# Patient Record
Sex: MALE | Race: WHITE | Employment: OTHER | URBAN - NONMETROPOLITAN AREA
[De-identification: names, ages, dates, MRNs, and addresses within clinical notes are randomized per-mention and may not be internally consistent; named-entity substitution may affect disease eponyms.]

---

## 2020-04-24 ENCOUNTER — TRANSFERRED RECORDS (OUTPATIENT)
Dept: HEALTH INFORMATION MANAGEMENT | Facility: HOSPITAL | Age: 75
End: 2020-04-24

## 2020-04-29 ENCOUNTER — TELEPHONE (OUTPATIENT)
Dept: FAMILY MEDICINE | Facility: OTHER | Age: 75
End: 2020-04-29

## 2020-04-29 NOTE — TELEPHONE ENCOUNTER
To: Surgery department  Patient sent in cologuard test that came back positive and he was suppose to get a call from us to set a up a consult for a colonoscopy.  His phone is [phone].  Thank you

## 2020-05-01 NOTE — TELEPHONE ENCOUNTER
Returned call to patient. He states that [facility]. I do not see a referral. Advised [facility] to contact the [facility] to [facility] referral and then we can get him scheduled.

## 2020-05-05 ENCOUNTER — TELEPHONE (OUTPATIENT)
Dept: SURGERY | Facility: OTHER | Age: 75
End: 2020-05-05

## 2020-05-05 NOTE — TELEPHONE ENCOUNTER
All referrals are suppose to go to the [facility] for the current week. Gave all refferals to Ingrid and she notified Juan of this.

## 2020-05-11 ENCOUNTER — PREP FOR PROCEDURE (OUTPATIENT)
Dept: SURGERY | Facility: OTHER | Age: 75
End: 2020-05-11

## 2020-05-11 ENCOUNTER — OFFICE VISIT (OUTPATIENT)
Dept: SURGERY | Facility: OTHER | Age: 75
End: 2020-05-11
Attending: SURGERY
Payer: COMMERCIAL

## 2020-05-11 VITALS
BODY MASS INDEX: 37.1 KG/M2 | TEMPERATURE: 97 F | HEART RATE: 67 BPM | WEIGHT: 265 LBS | OXYGEN SATURATION: 97 % | HEIGHT: 71 IN | DIASTOLIC BLOOD PRESSURE: 72 MMHG | SYSTOLIC BLOOD PRESSURE: 134 MMHG

## 2020-05-11 DIAGNOSIS — R19.5 POSITIVE COLORECTAL CANCER SCREENING USING COLOGUARD TEST: Primary | ICD-10-CM

## 2020-05-11 PROCEDURE — 99203 OFFICE O/P NEW LOW 30 MIN: CPT | Performed by: SURGERY

## 2020-05-11 PROCEDURE — G0463 HOSPITAL OUTPT CLINIC VISIT: HCPCS

## 2020-05-11 RX ORDER — SULFAMETHOXAZOLE AND TRIMETHOPRIM 400; 80 MG/1; MG/1
1 TABLET ORAL
COMMUNITY

## 2020-05-11 RX ORDER — GLUCOSAMINE HCL 500 MG
1 TABLET ORAL DAILY
COMMUNITY

## 2020-05-11 ASSESSMENT — PAIN SCALES - GENERAL: PAINLEVEL: NO PAIN (0)

## 2020-05-11 ASSESSMENT — MIFFLIN-ST. JEOR: SCORE: 1956.22

## 2020-05-11 NOTE — NURSING NOTE
"Chief Complaint   Patient presents with     Consult     Colonoscopy Consult; [facility] Ref[facility]       Initial /72 (BP Location: Right arm, Patient Position: Chair, Cuff Size: Adult Large)   Pulse 67   Temp 97  F (36.1  C) (Tympanic)   Ht 1.791 m (5' 10.5\")   Wt 120.2 kg (265 lb)   SpO2 97%   BMI 37.49 kg/m   Estimated body mass index is 37.49 kg/m  as calculated from the following:    Height as of this encounter: 1.791 m (5' 10.5\").    Weight as of this encounter: 120.2 kg (265 lb).  Medication Reconciliation: complete  Patricia Hamilton LPN    "

## 2020-05-11 NOTE — TELEPHONE ENCOUNTER
If someone has this referral I need it faxed to me asap to [phone], patient is registering. Told reg to let him know if we don't have the auth it will be billed to his PF ins.    I don't have it and it is not in his chart so far.      Thank you,    Anny MINS-PI    [address] [address]  Office: [phone] Fax [phone]

## 2020-05-11 NOTE — PROGRESS NOTES
CLINIC NOTE - CONSULT  5/11/2020    Patient:Parker Finley  [facility]    Reason for Referral: Positive ColoGuard and Family History of Colon Cancer (brother(s))    This is a 75 year old male with a need of a colonoscopy for Positive ColoGuard and Family History of Colon Cancer (brother(s)).     Personal history of colon cancer : NO   Family history of colon cancer : YES   Personal history of polyps : NO   Family history of polyps : NO   History of Inflammatory Bowel Disease : NO   History of rectal bleeding : NO   Changes in bowel habits : NO   Last colonoscopy : 7 years, 2 years since prior ColoGuard    Past Medical History:  No past medical history on file.    Past Surgical History:  Past Surgical History:   Procedure Laterality Date     COLONOSCOPY  8/5/2013    Procedure: COLONOSCOPY;  COLONOSCOPY;  Surgeon: Christiano Espino MD;  Location: [facility]     KNEE SURGERY Bilateral        Family History History:  No family history on file.    History of Tobacco Use:  History   Smoking Status     Former Smoker     Types: Cigarettes     Quit date: 8/25/1996   Smokeless Tobacco     Never Used       Current Medications:  Current Outpatient Medications   Medication Sig Dispense Refill     Apixaban (ELIQUIS PO) Take 2.5 mg by mouth daily       Cholecalciferol (VITAMIN D3) 75 MCG (3000 UT) TABS Take 1 tablet by mouth daily       FOLIC ACID PO Take 1 mg by mouth daily       HYDROCHLOROTHIAZIDE PO Take 12.5 mg by mouth daily       Levothyroxine Sodium (SYNTHROID PO) Take by mouth daily       METHOTREXATE PO Take 5 mg by mouth once a week       methylPREDNISolone (MEDROL) 8 MG tablet Take 4 mg by mouth 2 times daily       sulfamethoxazole-trimethoprim (BACTRIM) 400-80 MG tablet Take 1 tablet by mouth 2 times daily         Allergies:  No Known Allergies    ROS:  Pertinent items are noted in HPI.  All other systems are negative.    PHYSICAL EXAM:     Vital signs: /72 (BP Location: Right arm, Patient  "Position: Chair, Cuff Size: Adult Large)   Pulse 67   Temp 97  F (36.1  C) (Tympanic)   Ht 1.791 m (5' 10.5\")   Wt 120.2 kg (265 lb)   SpO2 97%   BMI 37.49 kg/m     Weight: [unfilled]   BMI: Body mass index is 37.49 kg/m .   General: Normal, healthy, cooperative, in no acute distress, alert   Skin: no jaundice   HEENT: PERRLA and EOMI   Neck: supple   Lungs: clear to auscultation   CV: Regular rate and rhythm without murmer   Abdominal: abdomen is soft without significant tenderness, masses, organomegaly or guarding   Extremities: No cyanosis, clubbing or edema noted bilaterally in Upper and Lower Extremities   Neurological: without deficit    ASSESSMENT:      75 year old male with a need of a colonoscopy for Positive ColoGuard and Family History of Colon Cancer (brother(s)).    PLAN:   A colonoscopy will be scheduled.  The procedure with their risks, benefits and alternatives were explained.  Risks include but are not limited to bleeding, perforation, missing lesions, need for additional procedures, reaction to anesthesia.  All the patients questions were answered.  The patient consents to proceed as planned.      "

## 2020-05-11 NOTE — PATIENT INSTRUCTIONS
Thank you for allowing Dr. Joseph and our surgical team to participate in your care.  If you have a scheduling or an appointment question please contact our Health Unit Coordinator, Lili,  at her direct line [phone].   ALL nursing questions or concerns can be directed to your surgical nurse at: [phone] -[place]    You are scheduled for a: Colonoscopy  Your procedure date is:     Your time is yet to be determined.  Same day surgery will call you the day prior to your procedure with your time to be in admitting.          HOW TO PREPARE-      You need to have a scheduled Pre-Op with your primary care physician within 30 days of your scheduled surgery. Please call as soon as possible to schedule this.       You need a friend or family member available to drive you home AND stay with you for 24 hours after you leave the hospital. You will not be allowed to drive yourself. IF you need to take a taxi or the bus you MUST have a responsible person to ride with you. YOUR PROCEDURE WILL BE CANCELLED IF YOU DO NOT HAVE A RESPONSIBLE ADULT TO DRIVE YOU HOME.       You CANNOT have anything to eat or drink after midnight the night before your surgery, ncluding water and coffee. Your stomach needs to be completely empty. Do NOT chew gum, suck on hard candy, or smoke. You can brush your teeth the morning of surgery.       You need to call our Surgery Education Nurses 1-2 weeks prior to your surgery date at  [phone] or toll free [phone]. Please have you medication and allergy lists ready.      Stop your aspirin or other NSAIDs(Ibuprofen, Motrin, Aleve, Celebrex, Naproxen, etc...) 7 days before your surgery.  Tylenol is safe to take.        Hospital admitting will call you the day before your surgery with your arrival time. If you are scheduled on a Monday admitting will call you the Friday before.      Please call your primary care physician if you should become ill within 24 hours of scheduled surgery.  (ex.vomiting, diarrhea, fever)          You will need to wash the night before AND the morning of you procedure with the supplied Hibiclens. Wash your Surgical area with your bare hands, apply friction and rinse. KEEP IT AWAY FROM YOUR EYES, EARS, NOSE AND MOUTH.

## 2020-05-12 ENCOUNTER — TELEPHONE (OUTPATIENT)
Dept: SURGERY | Facility: OTHER | Age: 75
End: 2020-05-12

## 2020-05-12 NOTE — TELEPHONE ENCOUNTER
This patient was scheduled for colonoscopy with Dr. Joseph on 5/19/2020 in error. He was moved to 5/21/2020 as Thursday is the day that scopes are available. I am unsure if the patient was notified of date change.    He was scheduled for COVID test on 5/16, but will need to r/s test to 5/18, also need to verify with patient if he was notified of date of procedure change.     Attempted to call patient, but he was not home. Left message with wife for patient to return call tomorrow to [phone].

## 2020-05-15 ENCOUNTER — ANESTHESIA EVENT (OUTPATIENT)
Dept: SURGERY | Facility: HOSPITAL | Age: 75
End: 2020-05-15
Payer: COMMERCIAL

## 2020-05-15 ENCOUNTER — TELEPHONE (OUTPATIENT)
Dept: SURGERY | Facility: OTHER | Age: 75
End: 2020-05-15

## 2020-05-15 NOTE — ANESTHESIA PREPROCEDURE EVALUATION
Anesthesia Pre-Procedure Evaluation    Patient: Parker Finley   MRN: [id number] : 1945          Preoperative Diagnosis: Positive colorectal cancer screening using Cologuard test [R19.5]    Procedure(s):  COLONOSCOPY    History reviewed. No pertinent past medical history.  Past Surgical History:   Procedure Laterality Date     COLONOSCOPY  2013    Procedure: COLONOSCOPY;  COLONOSCOPY;  Surgeon: Christiano Espino MD;  Location: [facility]     KNEE SURGERY Bilateral        Anesthesia Evaluation     . Pt has had prior anesthetic. Type: MAC and General    No history of anesthetic complications          ROS/MED HX    ENT/Pulmonary:     (+)ALIS risk factors obese, allergic rhinitis, other ENT- [place], tobacco use (quit ), Past use , . .    Neurologic:  - neg neurologic ROS     Cardiovascular:  - neg cardiovascular ROS       METS/Exercise Tolerance: Comment: [employer], yard work >4 METS   Hematologic: Comments: Was on coumadin for many years, now on apixaban. None since for 3 days    (+) History of blood clots (DVT ) pt is anticoagulated, -      Musculoskeletal:   (+) arthritis,  other musculoskeletal- Bilateral TKA      GI/Hepatic:     (+) bowel prep, Other GI/Hepatic constipation      Renal/Genitourinary:  - ROS Renal section negative       Endo:     (+) thyroid problem hypothyroidism, Chronic steroid usage (On methylprednisone 17 months, On methotrexate about a year. Took methylprenisone dose today, 4mg in am, 6mg at night. ) for Other Date most recently used: Polymyalgia Rheumatica,Obesity, Other Endocrine Disorder .      Psychiatric:  - neg psychiatric ROS       Infectious Disease:  - neg infectious disease ROS       Malignancy:   (+)   Positive cologuard     - no malignancy   Other:    - neg other ROS                      Physical Exam  Normal systems: cardiovascular and pulmonary    Airway   Mallampati: III  TM distance: >3 FB  Neck ROM: full    Dental   (+) chipped and caps    Cardiovascular  "  Rhythm and rate: regular and normal      Pulmonary     Other findings: Put on 40 pounds per patient due to prednisone        No results found for: WBC, HGB, HCT, PLT, CRP, SED, NA, POTASSIUM, CHLORIDE, CO2, BUN, CR, GLC, RADHA, PHOS, MAG, ALBUMIN, PROTTOTAL, ALT, AST, GGT, ALKPHOS, BILITOTAL, BILIDIRECT, LIPASE, AMYLASE, SARAH, PTT, INR, FIBR, TSH, T4, T3, HCG, HCGS, CKTOTAL, CKMB, TROPN    Preop Vitals  BP Readings from Last 3 Encounters:   05/11/20 134/72   08/05/13 138/90   07/30/13 124/72    Pulse Readings from Last 3 Encounters:   05/11/20 67   07/30/13 72      Resp Readings from Last 3 Encounters:   08/05/13 20   07/30/13 12    SpO2 Readings from Last 3 Encounters:   05/11/20 97%   08/05/13 95%      Temp Readings from Last 1 Encounters:   05/11/20 97  F (36.1  C) (Tympanic)    Ht Readings from Last 1 Encounters:   05/11/20 1.791 m (5' 10.5\")      Wt Readings from Last 1 Encounters:   05/11/20 120.2 kg (265 lb)    Estimated body mass index is 37.49 kg/m  as calculated from the following:    Height as of 5/11/20: 1.791 m (5' 10.5\").    Weight as of 5/11/20: 120.2 kg (265 lb).       Anesthesia Plan      History & Physical Review  History and physical reviewed and following examination; no interval change.    ASA Status:  3 .    NPO Status:  > 8 hours (Drank prep 630)    Plan for MAC with Intravenous and Propofol induction. Maintenance will be TIVA.  Reason for MAC:  Deep or markedly invasive procedure (G8)    Consult note 5/11/20  Covid negative  Risks and benefits of MAC anesthetic discussed including dental damage, aspiration, loss of airway, conversion to general anesthetic, CV complications, MI, stroke, death. Pt wishes to proceed.         Postoperative Care      Consents  Anesthetic plan, risks, benefits and alternatives discussed with:  Patient.  Use of blood products discussed: Yes.   Use of blood products discussed with Patient.  Consented to blood products.  .                 Elias Quintero, JONATHON " CRNA

## 2020-05-15 NOTE — TELEPHONE ENCOUNTER
"This patient is on Eliquis for DVT and irregular heartbeat and scheduled for colonoscopy for next Thursday. He states he was advised at his consult to stop Eliquis 5 days prior to colonoscopy. He checked with his anticoagulation clinic and was told he only needed to stop Eliquis 2 days before surgery \"because it leaves body faster.\" He is very worried about DVT.    How long do you want him to stop Eliquis prior to procedure?  "

## 2020-05-18 ENCOUNTER — OFFICE VISIT (OUTPATIENT)
Dept: FAMILY MEDICINE | Facility: OTHER | Age: 75
End: 2020-05-18
Payer: COMMERCIAL

## 2020-05-18 DIAGNOSIS — Z01.818 PREOPERATIVE EXAMINATION: Primary | ICD-10-CM

## 2020-05-18 PROCEDURE — 99207 ZZC NO CHARGE CURBSIDE PS: CPT

## 2020-05-18 PROCEDURE — 87635 SARS-COV-2 COVID-19 AMP PRB: CPT | Mod: 90

## 2020-05-18 PROCEDURE — 99000 SPECIMEN HANDLING OFFICE-LAB: CPT

## 2020-05-18 RX ORDER — LORATADINE 10 MG/1
10 TABLET ORAL DAILY
COMMUNITY

## 2020-05-18 RX ORDER — CHLORAL HYDRATE 500 MG
2 CAPSULE ORAL DAILY
COMMUNITY

## 2020-05-18 RX ORDER — ALENDRONATE SODIUM 35 MG/1
35 TABLET ORAL
COMMUNITY

## 2020-05-19 LAB
SARS-COV-2 RNA SPEC QL NAA+PROBE: NOT DETECTED
SPECIMEN SOURCE: NORMAL

## 2020-05-19 NOTE — TELEPHONE ENCOUNTER
Patient quit taking the Eliquis on Monday [facility] and was told it went out of the system in 1-2 days.   Mallory Coffey LPN

## 2020-05-19 NOTE — TELEPHONE ENCOUNTER
I have attempted to contact this patient by phone to return their call or discuss lab results, but there is no response.  Will try again later.  Mallory Coffey LPN

## 2020-05-21 ENCOUNTER — ANESTHESIA (OUTPATIENT)
Dept: SURGERY | Facility: HOSPITAL | Age: 75
End: 2020-05-21
Payer: COMMERCIAL

## 2020-05-21 ENCOUNTER — HOSPITAL ENCOUNTER (OUTPATIENT)
Facility: HOSPITAL | Age: 75
Discharge: HOME | End: 2020-05-21
Attending: SURGERY | Admitting: SURGERY
Payer: COMMERCIAL

## 2020-05-21 VITALS
OXYGEN SATURATION: 98 % | RESPIRATION RATE: 16 BRPM | BODY MASS INDEX: 36.68 KG/M2 | WEIGHT: 262 LBS | SYSTOLIC BLOOD PRESSURE: 120 MMHG | DIASTOLIC BLOOD PRESSURE: 79 MMHG | HEART RATE: 67 BPM | TEMPERATURE: 97.1 F | HEIGHT: 71 IN

## 2020-05-21 DIAGNOSIS — R19.5 POSITIVE COLORECTAL CANCER SCREENING USING COLOGUARD TEST: ICD-10-CM

## 2020-05-21 PROCEDURE — 25800030 ZZH RX IP 258 OP 636: Performed by: NURSE ANESTHETIST, CERTIFIED REGISTERED

## 2020-05-21 PROCEDURE — 99100 ANES PT EXTEME AGE<1 YR&>70: CPT | Performed by: NURSE ANESTHETIST, CERTIFIED REGISTERED

## 2020-05-21 PROCEDURE — 36000050 ZZH SURGERY LEVEL 2 1ST 30 MIN: Performed by: SURGERY

## 2020-05-21 PROCEDURE — 27210794 ZZH OR GENERAL SUPPLY STERILE: Performed by: SURGERY

## 2020-05-21 PROCEDURE — 71000027 ZZH RECOVERY PHASE 2 EACH 15 MINS: Performed by: SURGERY

## 2020-05-21 PROCEDURE — 37000008 ZZH ANESTHESIA TECHNICAL FEE, 1ST 30 MIN: Performed by: SURGERY

## 2020-05-21 PROCEDURE — 45385 COLONOSCOPY W/LESION REMOVAL: CPT | Performed by: SURGERY

## 2020-05-21 PROCEDURE — 45385 COLONOSCOPY W/LESION REMOVAL: CPT | Performed by: NURSE ANESTHETIST, CERTIFIED REGISTERED

## 2020-05-21 PROCEDURE — 40000305 ZZH STATISTIC PRE PROC ASSESS I: Performed by: SURGERY

## 2020-05-21 PROCEDURE — 88305 TISSUE EXAM BY PATHOLOGIST: CPT | Mod: TC | Performed by: SURGERY

## 2020-05-21 PROCEDURE — 25000125 ZZHC RX 250: Performed by: NURSE ANESTHETIST, CERTIFIED REGISTERED

## 2020-05-21 PROCEDURE — 25000128 H RX IP 250 OP 636: Performed by: NURSE ANESTHETIST, CERTIFIED REGISTERED

## 2020-05-21 RX ORDER — MEPERIDINE HYDROCHLORIDE 25 MG/ML
12.5 INJECTION INTRAMUSCULAR; INTRAVENOUS; SUBCUTANEOUS
Status: DISCONTINUED | OUTPATIENT
Start: 2020-05-21 | End: 2020-05-21 | Stop reason: HOSPADM

## 2020-05-21 RX ORDER — ONDANSETRON 4 MG/1
4 TABLET, ORALLY DISINTEGRATING ORAL EVERY 30 MIN PRN
Status: DISCONTINUED | OUTPATIENT
Start: 2020-05-21 | End: 2020-05-21 | Stop reason: HOSPADM

## 2020-05-21 RX ORDER — LIDOCAINE HYDROCHLORIDE 20 MG/ML
INJECTION, SOLUTION INFILTRATION; PERINEURAL PRN
Status: DISCONTINUED | OUTPATIENT
Start: 2020-05-21 | End: 2020-05-21

## 2020-05-21 RX ORDER — SODIUM CHLORIDE, SODIUM LACTATE, POTASSIUM CHLORIDE, CALCIUM CHLORIDE 600; 310; 30; 20 MG/100ML; MG/100ML; MG/100ML; MG/100ML
INJECTION, SOLUTION INTRAVENOUS CONTINUOUS
Status: DISCONTINUED | OUTPATIENT
Start: 2020-05-21 | End: 2020-05-21 | Stop reason: HOSPADM

## 2020-05-21 RX ORDER — LIDOCAINE 40 MG/G
CREAM TOPICAL
Status: DISCONTINUED | OUTPATIENT
Start: 2020-05-21 | End: 2020-05-21 | Stop reason: HOSPADM

## 2020-05-21 RX ORDER — PROPOFOL 10 MG/ML
INJECTION, EMULSION INTRAVENOUS PRN
Status: DISCONTINUED | OUTPATIENT
Start: 2020-05-21 | End: 2020-05-21

## 2020-05-21 RX ORDER — ONDANSETRON 2 MG/ML
4 INJECTION INTRAMUSCULAR; INTRAVENOUS EVERY 30 MIN PRN
Status: DISCONTINUED | OUTPATIENT
Start: 2020-05-21 | End: 2020-05-21 | Stop reason: HOSPADM

## 2020-05-21 RX ORDER — NALOXONE HYDROCHLORIDE 0.4 MG/ML
.1-.4 INJECTION, SOLUTION INTRAMUSCULAR; INTRAVENOUS; SUBCUTANEOUS
Status: DISCONTINUED | OUTPATIENT
Start: 2020-05-21 | End: 2020-05-21 | Stop reason: HOSPADM

## 2020-05-21 RX ADMIN — PROPOFOL 20 MG: 10 INJECTION, EMULSION INTRAVENOUS at 13:37

## 2020-05-21 RX ADMIN — SODIUM CHLORIDE, POTASSIUM CHLORIDE, SODIUM LACTATE AND CALCIUM CHLORIDE 1000 ML: 600; 310; 30; 20 INJECTION, SOLUTION INTRAVENOUS at 12:33

## 2020-05-21 RX ADMIN — PROPOFOL 20 MG: 10 INJECTION, EMULSION INTRAVENOUS at 13:33

## 2020-05-21 RX ADMIN — PROPOFOL 50 MG: 10 INJECTION, EMULSION INTRAVENOUS at 13:39

## 2020-05-21 RX ADMIN — SODIUM CHLORIDE, POTASSIUM CHLORIDE, SODIUM LACTATE AND CALCIUM CHLORIDE: 600; 310; 30; 20 INJECTION, SOLUTION INTRAVENOUS at 12:37

## 2020-05-21 RX ADMIN — PROPOFOL 50 MG: 10 INJECTION, EMULSION INTRAVENOUS at 13:28

## 2020-05-21 RX ADMIN — LIDOCAINE HYDROCHLORIDE 40 MG: 20 INJECTION, SOLUTION INFILTRATION; PERINEURAL at 13:25

## 2020-05-21 RX ADMIN — PROPOFOL 50 MG: 10 INJECTION, EMULSION INTRAVENOUS at 13:26

## 2020-05-21 RX ADMIN — PROPOFOL 20 MG: 10 INJECTION, EMULSION INTRAVENOUS at 13:31

## 2020-05-21 RX ADMIN — PROPOFOL 20 MG: 10 INJECTION, EMULSION INTRAVENOUS at 13:35

## 2020-05-21 ASSESSMENT — LIFESTYLE VARIABLES: TOBACCO_USE: 1

## 2020-05-21 ASSESSMENT — MIFFLIN-ST. JEOR: SCORE: 1942.61

## 2020-05-21 NOTE — OR NURSING
"The patient is stating relief of his abdominal cramping discomfort and \"gas pains\". He is ambulating in the room without increase in abdominal discomfort and tolerating po fluids without nausea. The patient's friend Demetrio was called and given discharge instructions with verbal understanding stated, and the patient was given instructions and the paper AVS. He verbalized understanding to restart his Eliquis tomorrow.  "

## 2020-05-21 NOTE — DISCHARGE INSTRUCTIONS
**Restart your Eliquis tomorrow 5/22  **You had one polyp removed and will be called with the polyp test results. Colonoscopy recommended in 5 years.        INSTRUCTIONS AFTER COLONOSCOPY    WHEN YOU ARE BACK HOME:    Plan to rest for an hour or two after you get home.    You may have some cramping or pressure until you pass gas.    You may resume your regular medications.    Eat a small, light meal at first, and then gradually return to normal meal sizes.    You will be contacted the next day to see how you are doing.  If you had a polyp removed:    Slight bleeding may occur.  You may have a slight blood stain on the toilet paper after a bowel movement.    To lessen the chance of bleeding, avoid heavy exercise for ONE WEEK.  This includes heavy lifting, vigorous sport activities, and heavy physical labor.  You may resume your normal sexual activity.      Avoid aspirin or aspirin products if instructed by your doctor.    If there is a polyp or biopsy, you will be contacted with results within one week.     WHAT TO WATCH FOR:  Problems rarely occur after the exam; however, it is important for you to watch for early signs of possible problems.  If you have     Unusual pain in your abdomen    Nausea and vomiting that persists    Excessive bleeding    Black or bloody bowel movements    Fever or temperature above 100.6 F  Please call your doctor ([facility] [phone]) or go to the nearest hospital emergency room.    Post-Anesthesia Patient Instructions    IMMEDIATELY FOLLOWING SURGERY:  Do not drive or operate machinery for the first twenty four hours after surgery.  Do not make any important decisions for twenty four hours after surgery or while taking narcotic pain medications or sedatives.  If you develop intractable nausea and vomiting or a severe headache please notify your doctor immediately.    FOLLOW-UP:  Please make an appointment with your surgeon as instructed. You do not need to follow up with  anesthesia unless specifically instructed to do so.    WOUND CARE INSTRUCTIONS (if applicable):  Keep a dry clean dressing on the anesthesia/puncture wound site if there is drainage.  Once the wound has quit draining you may leave it open to air.  Generally you should leave the bandage intact for twenty four hours unless there is drainage.  If the epidural site drains for more than 36-48 hours please call the anesthesia department.    QUESTIONS?:  Please feel free to call your physician or the hospital  if you have any questions, and they will be happy to assist you.

## 2020-05-21 NOTE — OR NURSING
"The patient ambulated to the bathroom and has been increasing activity/position changes to help with his \"bloated\" abdomen, states the sensation to pass gas but not passing gas. Denies nausea. He took apple juice and Heather Doone cookies prior to gas pains starting.  "

## 2020-05-21 NOTE — OP NOTE
REPORT OF OPERATION  DATE OF PROCEDURE: 5/21/2020    [facility]    SURGERY PREFORMED:   Colonoscopy     with biopsy    with use of cauterized snare    without tattooing    PREOPERATIVE DIAGNOSIS: Positive ColoGuard    POSTOPERATIVE DIAGNOSIS:    Same   Colon polyps, 100 cm   Diverticulosis was not identified.   Hemorrhoids  were  identified.    SURGEON: Isiah Joseph MD    ASSISTANTS: None    ANESTHESIA: Monitored Anesthesia Care    COMPLICATIONS: None apparent    TRANSFUSIONS: None     TISSUE TO PATHOLOGY: Polyps from 100 cm were sent to pathology for pathological diagnosis.    FINDINGS: Colon polyps, 100 cm.  Diverticulosis was not identified.  Hemorrhoids  were  identified.    INDICATIONS: This is a 75 year old male in need of a colonoscopy for Positive ColoGuard.  The patient will be taken to the endoscopy suite for that procedure.    DESCRIPTIONS OF PROCEDURE IN DETAIL: After consent was obtained the patient was taken to the endoscopy suite and placed in the left lateral decubitus position.  The patient was identified and the correct patient was confirmed.  Monitored Anesthesia Care was given.  A time out was preformed verifying the correct patient and the correct procedure.  The entire operative team was in agreement.  All necessary equipment and supplies were in the room.    Rectal exam was preformed and no lesions of the anal canal were noted.  The colonoscope was inserted into the anus and passed without difficulty to the cecum.  The cecum was identified by the ileocecal valve, the coalescence of the tinea and the appendiceal orifice.  Upon withdrawal all walls of the colon were visualized.  Polyps were identified at 100 cm.  These were removed by snare.  Tattooing their of the location was not done.  Large colon masses and colitis were not seen.colon  Diverticulosis was not seen.  Upon reaching the rectum the scope was retroflexed and internal hemorrhoids  were  seen.  The scope  was straightened back out and removed from the patient.  The patient was then taken to the recovery room in stable condition tolerating the procedure well.      Prep: good    Withdrawal time was 10 minutes.    It is recommended that the patient have another colonoscopy in 5 years.

## 2020-05-21 NOTE — OR NURSING
Patient and responsible adult given discharge instructions with no questions regarding instructions. Kimberlee score 18. Pain level 0/10.  Discharged from unit via ambulation. Patient discharged to home with friend Demetrio.

## 2020-05-21 NOTE — ANESTHESIA CARE TRANSFER NOTE
Patient: Parker Finley    Procedure(s):  COLONOSCOPY with polypectomy    Diagnosis: Positive colorectal cancer screening using Cologuard test [R19.5]  Diagnosis Additional Information: No value filed.    Anesthesia Type:   MAC     Note:  Airway :Nasal Cannula  Patient transferred to:Phase II  Handoff Report: Identifed the Patient, Identified the Reponsible Provider, Reviewed the pertinent medical history, Discussed the surgical course, Reviewed Intra-OP anesthesia mangement and issues during anesthesia, Set expectations for post-procedure period and Allowed opportunity for questions and acknowledgement of understanding      Vitals: (Last set prior to Anesthesia Care Transfer)    CRNA VITALS  5/21/2020 1311 - 5/21/2020 1348      5/21/2020             Pulse:  61    Ht Rate:  60    SpO2:  95 %    Resp Rate (set):  8                Electronically Signed By: JONATHON Vyas CRNA  May 21, 2020  1:48 PM

## 2020-05-21 NOTE — ANESTHESIA POSTPROCEDURE EVALUATION
Patient: Parker Finley    Procedure(s):  COLONOSCOPY with polypectomy    Diagnosis:Positive colorectal cancer screening using Cologuard test [R19.5]  Diagnosis Additional Information: No value filed.    Anesthesia Type:  MAC    Note:  Anesthesia Post Evaluation    Patient participation: Able to fully participate in evaluation  Level of consciousness: awake and alert  Pain management: adequate  Airway patency: patent  Cardiovascular status: acceptable  Respiratory status: acceptable  Hydration status: acceptable  PONV: none     Anesthetic complications: None          Last vitals:  Vitals:    05/21/20 1455 05/21/20 1510 05/21/20 1515   BP: 113/72 106/52 120/79   Pulse: 53 (!) 48 67   Resp: 16 16 16   Temp:   97.1  F (36.2  C)   SpO2: 97% 98% 98%         Electronically Signed By: JONATHON Burton CRNA  May 21, 2020  4:16 PM

## 2020-05-21 NOTE — OR NURSING
"The patient is passing a large amount of flatus and reports relief in his abdominal bloating discomfort to a \"dull ache\" at 3/10 intermittently with cramping. While up in the bathroom he passed some yellow clear liquid stool.  "

## 2020-05-26 LAB — COPATH REPORT: NORMAL

## (undated) DEVICE — CONNECTOR-ERBEFLO 2 PORT

## (undated) DEVICE — SNARE-ROTATABLE 20MM MINI OVAL

## (undated) DEVICE — TRAP-POLYP E-TRAP

## (undated) DEVICE — TUBING-SUCTION 20FT

## (undated) DEVICE — IRRIGATION-H2O 1000ML

## (undated) DEVICE — FORCEP-COLON BIOPSY LARGE W/NEEDLE 240CM

## (undated) RX ORDER — LIDOCAINE HYDROCHLORIDE 20 MG/ML
INJECTION, SOLUTION EPIDURAL; INFILTRATION; INTRACAUDAL; PERINEURAL
Status: DISPENSED
Start: 2020-05-21

## (undated) RX ORDER — PROPOFOL 10 MG/ML
INJECTION, EMULSION INTRAVENOUS
Status: DISPENSED
Start: 2020-05-21